# Patient Record
Sex: FEMALE | URBAN - METROPOLITAN AREA
[De-identification: names, ages, dates, MRNs, and addresses within clinical notes are randomized per-mention and may not be internally consistent; named-entity substitution may affect disease eponyms.]

---

## 2019-11-07 ENCOUNTER — OFFICE VISIT (OUTPATIENT)
Dept: URBAN - METROPOLITAN AREA CLINIC 71 | Facility: CLINIC | Age: 69
End: 2019-11-07
Payer: MEDICARE

## 2019-11-07 DIAGNOSIS — H25.13 NUCLEAR SCLEROSIS CATARACT, BILATERAL: ICD-10-CM

## 2019-11-07 DIAGNOSIS — H47.091 OTHER DISORDER OF OPTIC NERVE OF RIGHT EYE: ICD-10-CM

## 2019-11-07 PROCEDURE — 92134 CPTRZ OPH DX IMG PST SGM RTA: CPT | Performed by: OPTOMETRIST

## 2019-11-07 PROCEDURE — 99205 OFFICE O/P NEW HI 60 MIN: CPT | Performed by: OPTOMETRIST

## 2019-11-07 ASSESSMENT — INTRAOCULAR PRESSURE
OS: 11
OD: 13

## 2019-11-07 NOTE — IMPRESSION/PLAN
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 Glenn Trinidad was monitoring. Continue here after RD matters stable.

## 2019-11-07 NOTE — IMPRESSION/PLAN
Impression: Nuclear sclerosis cataract, bilateral: H25.13. Asymptomatic OU Plan: Cataracts affecting vision some, but no surgery is currently recommended. The patient will monitor vision changes and contact us with any decrease in vision. Continue to monitor.

## 2019-11-07 NOTE — IMPRESSION/PLAN
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 OS. nasal detachment, Mac on Plan: refer to retina asap. Advise surgery tonight or tomorrow. Pt is NPO since breakfast. Pt aware of danger of further vision loss without treatment.

## 2020-01-03 ENCOUNTER — POST-OPERATIVE VISIT (OUTPATIENT)
Dept: URBAN - METROPOLITAN AREA CLINIC 71 | Facility: CLINIC | Age: 70
End: 2020-01-03

## 2020-01-03 DIAGNOSIS — Z09 ENCNTR FOR F/U EXAM AFT TRTMT FOR COND OTH THAN MALIG NEOPLM: Primary | ICD-10-CM

## 2020-01-03 ASSESSMENT — INTRAOCULAR PRESSURE
OD: 12
OS: 15

## 2020-06-04 ENCOUNTER — OFFICE VISIT (OUTPATIENT)
Dept: URBAN - METROPOLITAN AREA CLINIC 71 | Facility: CLINIC | Age: 70
End: 2020-06-04
Payer: MEDICARE

## 2020-06-04 DIAGNOSIS — H16.223 KERATOCONJUNCTIVITIS SICCA, NOT SPECIFIED AS SJÖGREN'S, BILATERAL: ICD-10-CM

## 2020-06-04 DIAGNOSIS — H43.811 VITREOUS DEGENERATION, RIGHT EYE: ICD-10-CM

## 2020-06-04 DIAGNOSIS — H33.8 OTHER RETINAL DETACHMENTS: ICD-10-CM

## 2020-06-04 PROCEDURE — 92134 CPTRZ OPH DX IMG PST SGM RTA: CPT | Performed by: OPTOMETRIST

## 2020-06-04 PROCEDURE — 92014 COMPRE OPH EXAM EST PT 1/>: CPT | Performed by: OPTOMETRIST

## 2020-06-04 ASSESSMENT — VISUAL ACUITY
OD: 20/25
OS: 20/400

## 2020-06-04 ASSESSMENT — INTRAOCULAR PRESSURE
OD: 14
OS: 13

## 2020-06-04 NOTE — IMPRESSION/PLAN
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 Clarence Fischer was monitoring. Continue here after RD matters and cataract surgery stable.

## 2020-06-04 NOTE — IMPRESSION/PLAN
Impression: Combined forms of age-related cataract, bilateral: H25.813. OS much worse than OD s/p RD repair x2 OS. Plan: Cataracts account for the patient's complaints. Patient understands changing glasses will not significantly improve vision. Cataract surgery should improve vision OS. Patient willing to have surgery. Recommend cataract surgery OS, maybe OD. Distance target.

## 2020-06-04 NOTE — IMPRESSION/PLAN
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 OS. nasal detachment, Mac on. Repaired x2. Silicone oil removed. Plan: Continue care with Dr. Steffan Nageotte. Will need retina clearance for cataracty surgery.

## 2020-06-24 ENCOUNTER — PRE-OPERATIVE VISIT (OUTPATIENT)
Dept: URBAN - METROPOLITAN AREA CLINIC 71 | Facility: CLINIC | Age: 70
End: 2020-06-24
Payer: MEDICARE

## 2020-06-24 DIAGNOSIS — H25.813 COMBINED FORMS OF AGE-RELATED CATARACT, BILATERAL: Primary | ICD-10-CM

## 2020-06-24 DIAGNOSIS — H52.223 REGULAR ASTIGMATISM, BILATERAL: ICD-10-CM

## 2020-06-24 DIAGNOSIS — H43.813 VITREOUS DEGENERATION, BILATERAL: ICD-10-CM

## 2020-06-24 PROCEDURE — 92014 COMPRE OPH EXAM EST PT 1/>: CPT | Performed by: OPHTHALMOLOGY

## 2020-06-24 PROCEDURE — 92136 OPHTHALMIC BIOMETRY: CPT | Performed by: OPHTHALMOLOGY

## 2020-06-24 ASSESSMENT — PACHYMETRY
OD: 3.11
OS: 24.49
OS: 3.12
OD: 24.75

## 2020-06-24 ASSESSMENT — INTRAOCULAR PRESSURE
OS: 14
OD: 14

## 2020-06-24 NOTE — IMPRESSION/PLAN
Impression: Combined forms of age-related cataract, bilateral: H25.813. Plan: Educational materials provided: Cataract extraction/lens. New medication(s) Rx given today. Discussed signs and symptoms of retinal detachment. Discussed risks of progression. Reassured patient of current condition and treatment. Discussed diagnosis in detail with patient. Discussed treatment options with patient. Discussed risks and benefits and patient understands. Lid scrubs and hygiene were explained. Pre op instructions given and understood. Post op instructions given and understood. Patient elects to have surgery. Surgical treatment is required. PT HAS CHOSEN CARE PLUS PKG WITH ORA, DISTANCE IOL AND TRIMOXY WITH TOBRADEX (SAMPLE GIVEN) BID X 10 DAYS STARTING DAY AFTER SURGERY. SURGERY IS DEEMED NECESSARY TO IMPROVE VISION AND QUALITY OF LIFE.

## 2020-06-24 NOTE — IMPRESSION/PLAN
Impression: Other retinal detachments: H33.8 OS.  Plan: PT IS SEEING [facility] AND IS CLEARED FOR SURGERY

## 2020-07-28 ENCOUNTER — SURGERY (OUTPATIENT)
Dept: URBAN - METROPOLITAN AREA SURGERY 44 | Facility: SURGERY | Age: 70
End: 2020-07-28
Payer: MEDICARE

## 2020-07-28 ENCOUNTER — SURGERY (OUTPATIENT)
Dept: URBAN - METROPOLITAN AREA SURGERY 45 | Facility: SURGERY | Age: 70
End: 2020-07-28
Payer: MEDICARE

## 2020-07-28 PROCEDURE — 66984 XCAPSL CTRC RMVL W/O ECP: CPT | Performed by: OPHTHALMOLOGY

## 2020-07-28 PROCEDURE — G8918 PT W/O PREOP ORDER IV AB PRO: HCPCS | Performed by: CLINIC/CENTER

## 2020-07-28 PROCEDURE — G8907 PT DOC NO EVENTS ON DISCHARG: HCPCS | Performed by: CLINIC/CENTER

## 2020-07-29 ENCOUNTER — POST-OPERATIVE VISIT (OUTPATIENT)
Dept: URBAN - METROPOLITAN AREA CLINIC 71 | Facility: CLINIC | Age: 70
End: 2020-07-29
Payer: MEDICARE

## 2020-07-29 PROCEDURE — 99024 POSTOP FOLLOW-UP VISIT: CPT | Performed by: OPHTHALMOLOGY

## 2020-07-29 ASSESSMENT — INTRAOCULAR PRESSURE
OS: 12
OD: 11

## 2020-07-29 NOTE — IMPRESSION/PLAN
Impression: S/P SN60WF 19.5 care plus far OS - 1 Day. Encounter for surgical aftercare following surgery on a sense organ  Z48.810. Plan: looks good- healing well, explained to pt about the swelling and that it will take a couple days for it to go down. ok to proceed with the right eye.  --Continue Ofloxacin 0.3% 3x a day --Continue Pred Forte 3x a day

## 2020-08-03 ENCOUNTER — POST-OPERATIVE VISIT (OUTPATIENT)
Dept: URBAN - METROPOLITAN AREA CLINIC 71 | Facility: CLINIC | Age: 70
End: 2020-08-03
Payer: MEDICARE

## 2020-08-03 PROCEDURE — 99024 POSTOP FOLLOW-UP VISIT: CPT | Performed by: OPTOMETRIST

## 2020-08-03 ASSESSMENT — KERATOMETRY
OS: 41.50
OD: 42.13

## 2020-08-03 ASSESSMENT — INTRAOCULAR PRESSURE
OD: 11
OS: 12

## 2020-08-03 NOTE — IMPRESSION/PLAN
Impression: S/P SN60WF 19.5 care plus Far trimoxi OS - . Encounter for surgical aftercare following surgery on a sense organ  Z48.810. Plan: looks good healing well, pt would like to wait right now to heal and give it 2 weeks. then will schedule for the right eye with [facility].  --Continue Ofloxacin 0.3% 3x a day --Continue Pred Forte 4x 1 week, then taper 3x 1 wk, 2x 1, 1x 1 then d/c

## 2020-08-04 ENCOUNTER — SURGERY (OUTPATIENT)
Dept: URBAN - METROPOLITAN AREA SURGERY 45 | Facility: SURGERY | Age: 70
End: 2020-08-04
Payer: MEDICARE

## 2020-08-04 ENCOUNTER — SURGERY (OUTPATIENT)
Dept: URBAN - METROPOLITAN AREA SURGERY 44 | Facility: SURGERY | Age: 70
End: 2020-08-04
Payer: MEDICARE

## 2020-08-04 PROCEDURE — G8907 PT DOC NO EVENTS ON DISCHARG: HCPCS | Performed by: CLINIC/CENTER

## 2020-08-04 PROCEDURE — 66984 XCAPSL CTRC RMVL W/O ECP: CPT | Performed by: OPHTHALMOLOGY

## 2020-08-04 PROCEDURE — G8918 PT W/O PREOP ORDER IV AB PRO: HCPCS | Performed by: CLINIC/CENTER

## 2020-08-17 ENCOUNTER — POST-OPERATIVE VISIT (OUTPATIENT)
Dept: URBAN - METROPOLITAN AREA CLINIC 71 | Facility: CLINIC | Age: 70
End: 2020-08-17
Payer: MEDICARE

## 2020-08-17 DIAGNOSIS — Z48.810 ENCOUNTER FOR SURGICAL AFTERCARE FOLLOWING SURGERY ON A SENSE ORGAN: Primary | ICD-10-CM

## 2020-08-17 PROCEDURE — 99024 POSTOP FOLLOW-UP VISIT: CPT | Performed by: OPTOMETRIST

## 2020-08-17 ASSESSMENT — INTRAOCULAR PRESSURE
OS: 25
OD: 10

## 2020-08-17 NOTE — IMPRESSION/PLAN
Impression: S/P SN60WF 19.5 care plus far trimoxi OS - 20 Days. Encounter for surgical aftercare following surgery on a sense organ  Z48.810. Excellent post op course   Post operative instructions reviewed - Condition is improving - Cataract OS.

 - Doing well s/p cat ext w/ IOL - edema improved
 - mild steroid response OS - discussed etiology.  Recommend restarting timolol BID OS until done w/ taper  - may need similar management OD
 - OK for 2nd eye (OD) Sx Plan: RTC as scheduled for 2nd eye Sx Continue steroid taper OS - start timolol BID (pt has)

## 2020-09-02 ENCOUNTER — POST-OPERATIVE VISIT (OUTPATIENT)
Dept: URBAN - METROPOLITAN AREA CLINIC 71 | Facility: CLINIC | Age: 70
End: 2020-09-02
Payer: MEDICARE

## 2020-09-02 PROCEDURE — 99024 POSTOP FOLLOW-UP VISIT: CPT | Performed by: OPHTHALMOLOGY

## 2020-09-02 ASSESSMENT — INTRAOCULAR PRESSURE
OS: 12
OD: 27

## 2020-09-02 NOTE — IMPRESSION/PLAN
Impression: S/P CE/Standard IOL SN60WF 19.50 OD - 1 Day. Presence of intraocular lens  Z96.1. Healing well. Looks good. Continue PO drops BID OD for 10 days. Patient to call if pain occurs.  Plan: Patient to return in 4 weeks for PO appt with ref --Continue Ofloxacin 0.3%

## 2020-09-28 ENCOUNTER — POST-OPERATIVE VISIT (OUTPATIENT)
Dept: URBAN - METROPOLITAN AREA CLINIC 71 | Facility: CLINIC | Age: 70
End: 2020-09-28
Payer: MEDICARE

## 2020-09-28 PROCEDURE — 99024 POSTOP FOLLOW-UP VISIT: CPT | Performed by: OPTOMETRIST

## 2020-09-28 ASSESSMENT — INTRAOCULAR PRESSURE
OD: 10
OS: 10

## 2021-02-15 ENCOUNTER — OFFICE VISIT (OUTPATIENT)
Dept: URBAN - METROPOLITAN AREA CLINIC 71 | Facility: CLINIC | Age: 71
End: 2021-02-15
Payer: MEDICARE

## 2021-02-15 DIAGNOSIS — Z96.1 PRESENCE OF INTRAOCULAR LENS: ICD-10-CM

## 2021-02-15 PROCEDURE — 99213 OFFICE O/P EST LOW 20 MIN: CPT | Performed by: OPTOMETRIST

## 2021-02-15 ASSESSMENT — INTRAOCULAR PRESSURE
OS: 15
OD: 15

## 2021-02-15 ASSESSMENT — VISUAL ACUITY
OD: 20/30
OS: 20/20

## 2021-02-15 NOTE — IMPRESSION/PLAN
Impression: Other secondary cataract, right eye: H26.491. Plan: Discussed treatment options with pt. Discussed risks and benefits or surgery- Pt elects to move forward with surgical treatment. order for surgery.

## 2021-03-11 ENCOUNTER — PRE-OPERATIVE VISIT (OUTPATIENT)
Dept: URBAN - METROPOLITAN AREA CLINIC 71 | Facility: CLINIC | Age: 71
End: 2021-03-11
Payer: MEDICARE

## 2021-03-11 DIAGNOSIS — H33.011 RETINAL DETACHMENT WITH SINGLE BREAK, RIGHT EYE: ICD-10-CM

## 2021-03-11 DIAGNOSIS — H26.491 OTHER SECONDARY CATARACT, RIGHT EYE: Primary | ICD-10-CM

## 2021-03-11 PROCEDURE — 92014 COMPRE OPH EXAM EST PT 1/>: CPT | Performed by: OPHTHALMOLOGY

## 2021-03-11 NOTE — IMPRESSION/PLAN
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 OPTOS ordered today and reviewed. Discussed diagnosis with patient. Temporal mac on RD OD w/ fluid extended to the disc. Plan: Referring patient to a retinal specialist today. Call if any changes in vision occur.

## 2021-03-11 NOTE — IMPRESSION/PLAN
Impression: Other secondary cataract, right eye: H26.491. Plan: OD: Discussed diagnosis in detail with patient. Discussed treatment options with patient. Advised patient of condition. Surgical treatment is necessary to improve vision. Patient elects to have surgery. Surgical risks and benefits were discussed, explained and understood by patient. Discussed signs and symptoms of retinal detachment. Discussed signs and symptoms of PVD/floaters. Schedule YAG PI - OD. Pre op instructions given and understood. Post op instructions given and understood. Continue using current medication(s). PROCEED WITH YAG LASER CAPSULOTOMY OD ONLY, CS VIDEO AND FORMS COMPLETE.